# Patient Record
Sex: FEMALE | Race: BLACK OR AFRICAN AMERICAN | URBAN - METROPOLITAN AREA
[De-identification: names, ages, dates, MRNs, and addresses within clinical notes are randomized per-mention and may not be internally consistent; named-entity substitution may affect disease eponyms.]

---

## 2024-03-28 ENCOUNTER — LAB (OUTPATIENT)
Dept: URBAN - METROPOLITAN AREA CLINIC 74 | Facility: CLINIC | Age: 59
End: 2024-03-28

## 2024-03-28 ENCOUNTER — OV NP (OUTPATIENT)
Dept: URBAN - METROPOLITAN AREA CLINIC 74 | Facility: CLINIC | Age: 59
End: 2024-03-28
Payer: COMMERCIAL

## 2024-03-28 VITALS
WEIGHT: 204 LBS | BODY MASS INDEX: 33.99 KG/M2 | OXYGEN SATURATION: 97 % | SYSTOLIC BLOOD PRESSURE: 140 MMHG | HEIGHT: 65 IN | TEMPERATURE: 97.7 F | DIASTOLIC BLOOD PRESSURE: 90 MMHG | HEART RATE: 88 BPM

## 2024-03-28 DIAGNOSIS — G47.30 SLEEP APNEA IN ADULT: ICD-10-CM

## 2024-03-28 DIAGNOSIS — Z86.018 HISTORY OF UTERINE FIBROID: ICD-10-CM

## 2024-03-28 DIAGNOSIS — Z87.42 HISTORY OF MENORRHAGIA: ICD-10-CM

## 2024-03-28 DIAGNOSIS — E66.9 OBESITY (BMI 30-39.9): ICD-10-CM

## 2024-03-28 DIAGNOSIS — R10.819 LOWER ABDOMINAL TENDERNESS: ICD-10-CM

## 2024-03-28 DIAGNOSIS — K92.1 HEMATOCHEZIA: ICD-10-CM

## 2024-03-28 DIAGNOSIS — Z86.2 HISTORY OF ANEMIA: ICD-10-CM

## 2024-03-28 DIAGNOSIS — J45.901 ASTHMA EXACERBATION, MILD: ICD-10-CM

## 2024-03-28 PROBLEM — 275538002: Status: ACTIVE | Noted: 2024-03-28

## 2024-03-28 PROBLEM — 73430006: Status: ACTIVE | Noted: 2024-03-28

## 2024-03-28 PROBLEM — 161782001: Status: ACTIVE | Noted: 2024-03-28

## 2024-03-28 PROBLEM — 43478001: Status: ACTIVE | Noted: 2024-03-28

## 2024-03-28 PROBLEM — 449341000124102: Status: ACTIVE | Noted: 2024-03-28

## 2024-03-28 PROBLEM — 162864005: Status: ACTIVE | Noted: 2024-03-28

## 2024-03-28 PROBLEM — 271902005: Status: ACTIVE | Noted: 2024-03-28

## 2024-03-28 PROBLEM — 281239006: Status: ACTIVE | Noted: 2024-03-28

## 2024-03-28 PROCEDURE — 99204 OFFICE O/P NEW MOD 45 MIN: CPT | Performed by: INTERNAL MEDICINE

## 2024-03-28 RX ORDER — CYCLOBENZAPRINE HYDROCHLORIDE 10 MG/1
TABLET, FILM COATED ORAL
Qty: 21 TABLET | Status: ACTIVE | COMMUNITY

## 2024-03-28 RX ORDER — ERGOCALCIFEROL 1.25 MG/1
TAKE 1 CAPSULE BY MOUTH ONE TIME PER WEEK CAPSULE, LIQUID FILLED ORAL
Qty: 12 EACH | Refills: 0 | Status: ACTIVE | COMMUNITY

## 2024-03-28 RX ORDER — LOSARTAN POTASSIUM AND HYDROCHLOROTHIAZIDE 50; 12.5 MG/1; MG/1
TAKE 1 TABLET BY MOUTH EVERY DAY TABLET, FILM COATED ORAL
Qty: 90 EACH | Refills: 1 | Status: ACTIVE | COMMUNITY

## 2024-03-28 RX ORDER — BENZONATATE 200 MG/1
TAKE 1 CAPSULE (ORAL) 3 TIMES PER DAY (COUGH) FOR 7 DAYS CAPSULE ORAL
Qty: 21 EACH | Refills: 0 | Status: ACTIVE | COMMUNITY

## 2024-03-28 RX ORDER — FLUTICASONE FUROATE AND VILANTEROL TRIFENATATE 100; 25 UG/1; UG/1
POWDER RESPIRATORY (INHALATION)
Qty: 60 EACH | Status: ACTIVE | COMMUNITY

## 2024-03-28 RX ORDER — PREDNISONE 10 MG/1
TAKE 1 TABLET BY MOUTH TWICE A DAY FOR 5 DAYS TABLET ORAL
Qty: 10 EACH | Refills: 0 | Status: ACTIVE | COMMUNITY

## 2024-03-28 RX ORDER — SODIUM, POTASSIUM,MAG SULFATES 17.5-3.13G
AS DIRECTED SOLUTION, RECONSTITUTED, ORAL ORAL
OUTPATIENT
Start: 2024-03-28

## 2024-03-28 RX ORDER — METOPROLOL TARTRATE 50 MG/1
TAKE 1 TABLET BY MOUTH THE NIGHT BEFORE AND 1 TAB THE MORNING OF YOUR COROBARY CTA TABLET, FILM COATED ORAL
Qty: 2 EACH | Refills: 0 | Status: ACTIVE | COMMUNITY

## 2024-03-28 RX ORDER — DICLOFENAC SODIUM 50 MG/1
TABLET, DELAYED RELEASE ORAL
Qty: 14 TABLET | Status: ACTIVE | COMMUNITY

## 2024-03-28 RX ORDER — BACLOFEN 5 MG/1
TAKE 1 TABLET BY MOUTH EVERYDAY AT BEDTIME TABLET ORAL
Qty: 30 EACH | Refills: 0 | Status: ACTIVE | COMMUNITY

## 2024-03-28 RX ORDER — TRAMADOL HYDROCHLORIDE 50 MG/1
TAKE 1 TABLET (50 MG) BY MOUTH IN THE MORNING AND AT BEDTIME TABLET, FILM COATED ORAL
Qty: 30 EACH | Refills: 0 | Status: ACTIVE | COMMUNITY

## 2024-03-28 NOTE — PHYSICAL EXAM GASTROINTESTINAL
Abdomen, soft, tender lower abdomen, nondistended, no guarding or rigidity, no masses palpable, normal bowel sounds, Liver and Spleen,  no hepatosplenomegaly, liver nontender

## 2024-03-28 NOTE — HPI-TODAY'S VISIT:
The patient is a 58-year-old Afro-American female self-referred who presents to the office stating that for the past year she has been passing blood through the rectum.  Initially it was lower volumes, always bright red, always at the time of defecation, stools fluctuated in between type I and type IV on the [place] scale.  The patient has occasional anal discomfort at the time of defecation. The patient states that this week she had a larger amount of blood in the toilet bowl and the stool was darker.  That occurred 3 days ago, at the time stool was dark red and she noticed small clots on the toilet tissue. Since then stool became type IV on the [place] scale, light brown and with no blood. The patient has complaint of vague discomfort across the lower abdomen and has slight tenderness in the left lower quadrant and suprapubic areas. The patient also complaint of intermittent nausea with no vomiting, has early satiety, denies having any dysphagia, odynophagia, there has been no vomiting, no hematemesis or melena. The patient states that most of her adult life she has had anemia and in the past received intravenous iron infusions and currently takes oral supplements.  The patient was diagnosed with menorrhagia due to large fibroids, the patient had a uterine fibroid removed. The patient states that she had a colonoscopy maybe 7 years ago and no abnormalities were found. Currently she has a history of asthma and takes inhalers, might have sleep apnea, is in the process of being evaluated by her primary care physician. The patient has been recommended to get a CBC, iron studies and a ferritin, C-reactive protein, the patient is to get a CT of the abdomen and pelvis with contrast as long as the renal function allows it, the patient will be scheduled to get a colonoscopy, benefits potential complications and alternatives to colonoscopy were disclosed. The patient is to get clearance from PCP in view of the history of persistent asthma and possibly sleep apnea. The patient is obese.  The patient might have fatty liver.

## 2024-06-20 ENCOUNTER — TELEPHONE ENCOUNTER (OUTPATIENT)
Dept: URBAN - METROPOLITAN AREA CLINIC 74 | Facility: CLINIC | Age: 59
End: 2024-06-20

## 2024-07-16 ENCOUNTER — WEB ENCOUNTER (OUTPATIENT)
Dept: URBAN - METROPOLITAN AREA CLINIC 74 | Facility: CLINIC | Age: 59
End: 2024-07-16

## 2024-07-23 ENCOUNTER — OFFICE VISIT (OUTPATIENT)
Dept: URBAN - METROPOLITAN AREA SURGERY CENTER 30 | Facility: SURGERY CENTER | Age: 59
End: 2024-07-23

## 2024-08-06 ENCOUNTER — OFFICE VISIT (OUTPATIENT)
Dept: URBAN - METROPOLITAN AREA SURGERY CENTER 30 | Facility: SURGERY CENTER | Age: 59
End: 2024-08-06
Payer: COMMERCIAL

## 2024-08-06 DIAGNOSIS — D50.0 1. ANEMIA, IRON DEFICIENCY FROM CHRONIC BLOOD LOSS:: ICD-10-CM

## 2024-08-06 DIAGNOSIS — K62.5 ANAL BLEEDING: ICD-10-CM

## 2024-08-06 DIAGNOSIS — R10.31 ABDOMINAL CRAMPING IN RIGHT LOWER QUADRANT: ICD-10-CM

## 2024-08-06 DIAGNOSIS — K64.8 OTHER HEMORRHOIDS: ICD-10-CM

## 2024-08-06 DIAGNOSIS — R10.32 ABDOMINAL CRAMPING IN LEFT LOWER QUADRANT: ICD-10-CM

## 2024-08-06 DIAGNOSIS — K62.5 RECTAL BLEEDING: ICD-10-CM

## 2024-08-06 PROCEDURE — 00811 ANES LWR INTST NDSC NOS: CPT | Performed by: NURSE ANESTHETIST, CERTIFIED REGISTERED

## 2024-08-06 PROCEDURE — 45378 DIAGNOSTIC COLONOSCOPY: CPT | Performed by: INTERNAL MEDICINE

## 2024-08-06 RX ORDER — BACLOFEN 5 MG/1
TAKE 1 TABLET BY MOUTH EVERYDAY AT BEDTIME TABLET ORAL
Qty: 30 EACH | Refills: 0 | Status: ACTIVE | COMMUNITY

## 2024-08-06 RX ORDER — CYCLOBENZAPRINE HYDROCHLORIDE 10 MG/1
TABLET, FILM COATED ORAL
Qty: 21 TABLET | Status: ACTIVE | COMMUNITY

## 2024-08-06 RX ORDER — METOPROLOL TARTRATE 50 MG/1
TAKE 1 TABLET BY MOUTH THE NIGHT BEFORE AND 1 TAB THE MORNING OF YOUR COROBARY CTA TABLET, FILM COATED ORAL
Qty: 2 EACH | Refills: 0 | Status: ACTIVE | COMMUNITY

## 2024-08-06 RX ORDER — SODIUM, POTASSIUM,MAG SULFATES 17.5-3.13G
AS DIRECTED SOLUTION, RECONSTITUTED, ORAL ORAL
Status: ACTIVE | COMMUNITY
Start: 2024-03-28

## 2024-08-06 RX ORDER — FLUTICASONE FUROATE AND VILANTEROL TRIFENATATE 100; 25 UG/1; UG/1
POWDER RESPIRATORY (INHALATION)
Qty: 60 EACH | Status: ACTIVE | COMMUNITY

## 2024-08-06 RX ORDER — BENZONATATE 200 MG/1
TAKE 1 CAPSULE (ORAL) 3 TIMES PER DAY (COUGH) FOR 7 DAYS CAPSULE ORAL
Qty: 21 EACH | Refills: 0 | Status: ACTIVE | COMMUNITY

## 2024-08-06 RX ORDER — TRAMADOL HYDROCHLORIDE 50 MG/1
TAKE 1 TABLET (50 MG) BY MOUTH IN THE MORNING AND AT BEDTIME TABLET, FILM COATED ORAL
Qty: 30 EACH | Refills: 0 | Status: ACTIVE | COMMUNITY

## 2024-08-06 RX ORDER — DICLOFENAC SODIUM 50 MG/1
TABLET, DELAYED RELEASE ORAL
Qty: 14 TABLET | Status: ACTIVE | COMMUNITY

## 2024-08-06 RX ORDER — ERGOCALCIFEROL 1.25 MG/1
TAKE 1 CAPSULE BY MOUTH ONE TIME PER WEEK CAPSULE, LIQUID FILLED ORAL
Qty: 12 EACH | Refills: 0 | Status: ACTIVE | COMMUNITY

## 2024-08-06 RX ORDER — PREDNISONE 10 MG/1
TAKE 1 TABLET BY MOUTH TWICE A DAY FOR 5 DAYS TABLET ORAL
Qty: 10 EACH | Refills: 0 | Status: ACTIVE | COMMUNITY

## 2024-08-06 RX ORDER — LOSARTAN POTASSIUM AND HYDROCHLOROTHIAZIDE 50; 12.5 MG/1; MG/1
TAKE 1 TABLET BY MOUTH EVERY DAY TABLET, FILM COATED ORAL
Qty: 90 EACH | Refills: 1 | Status: ACTIVE | COMMUNITY

## 2024-09-10 ENCOUNTER — OFFICE VISIT (OUTPATIENT)
Dept: URBAN - METROPOLITAN AREA CLINIC 74 | Facility: CLINIC | Age: 59
End: 2024-09-10
Payer: COMMERCIAL

## 2024-09-10 ENCOUNTER — DASHBOARD ENCOUNTERS (OUTPATIENT)
Age: 59
End: 2024-09-10

## 2024-09-10 VITALS
TEMPERATURE: 98 F | BODY MASS INDEX: 33.39 KG/M2 | SYSTOLIC BLOOD PRESSURE: 142 MMHG | OXYGEN SATURATION: 98 % | HEART RATE: 71 BPM | WEIGHT: 200.4 LBS | HEIGHT: 65 IN | DIASTOLIC BLOOD PRESSURE: 84 MMHG

## 2024-09-10 DIAGNOSIS — K92.1 HEMATOCHEZIA: ICD-10-CM

## 2024-09-10 DIAGNOSIS — K64.8 INTERNAL HEMORRHOIDS: ICD-10-CM

## 2024-09-10 PROCEDURE — 99213 OFFICE O/P EST LOW 20 MIN: CPT

## 2024-09-10 RX ORDER — CYCLOBENZAPRINE HYDROCHLORIDE 10 MG/1
TABLET, FILM COATED ORAL
Qty: 21 TABLET | Status: DISCONTINUED | COMMUNITY

## 2024-09-10 RX ORDER — BENZONATATE 200 MG/1
TAKE 1 CAPSULE (ORAL) 3 TIMES PER DAY (COUGH) FOR 7 DAYS CAPSULE ORAL
Qty: 21 EACH | Refills: 0 | Status: DISCONTINUED | COMMUNITY

## 2024-09-10 RX ORDER — METOPROLOL TARTRATE 50 MG/1
TAKE 1 TABLET BY MOUTH THE NIGHT BEFORE AND 1 TAB THE MORNING OF YOUR COROBARY CTA TABLET, FILM COATED ORAL
Qty: 2 EACH | Refills: 0 | Status: DISCONTINUED | COMMUNITY

## 2024-09-10 RX ORDER — TRAMADOL HYDROCHLORIDE 50 MG/1
TAKE 1 TABLET (50 MG) BY MOUTH IN THE MORNING AND AT BEDTIME TABLET, FILM COATED ORAL
Qty: 30 EACH | Refills: 0 | Status: ACTIVE | COMMUNITY

## 2024-09-10 RX ORDER — LOSARTAN POTASSIUM AND HYDROCHLOROTHIAZIDE 50; 12.5 MG/1; MG/1
TAKE 1 TABLET BY MOUTH EVERY DAY TABLET, FILM COATED ORAL
Qty: 90 EACH | Refills: 1 | Status: ACTIVE | COMMUNITY

## 2024-09-10 RX ORDER — FLUTICASONE FUROATE AND VILANTEROL TRIFENATATE 100; 25 UG/1; UG/1
POWDER RESPIRATORY (INHALATION)
Qty: 60 EACH | Status: ACTIVE | COMMUNITY

## 2024-09-10 RX ORDER — DICLOFENAC SODIUM 50 MG/1
TABLET, DELAYED RELEASE ORAL
Qty: 14 TABLET | Status: ACTIVE | COMMUNITY

## 2024-09-10 RX ORDER — FLUTICASONE PROPIONATE 50 UG/1
1 SPRAY IN EACH NOSTRIL SPRAY, METERED NASAL ONCE A DAY
Status: ACTIVE | COMMUNITY

## 2024-09-10 RX ORDER — SODIUM, POTASSIUM,MAG SULFATES 17.5-3.13G
AS DIRECTED SOLUTION, RECONSTITUTED, ORAL ORAL
Status: DISCONTINUED | COMMUNITY
Start: 2024-03-28

## 2024-09-10 RX ORDER — BACLOFEN 5 MG/1
TAKE 1 TABLET BY MOUTH EVERYDAY AT BEDTIME TABLET ORAL
Qty: 30 EACH | Refills: 0 | Status: ACTIVE | COMMUNITY

## 2024-09-10 RX ORDER — ERGOCALCIFEROL 1.25 MG/1
TAKE 1 CAPSULE BY MOUTH ONE TIME PER WEEK CAPSULE, LIQUID FILLED ORAL
Qty: 12 EACH | Refills: 0 | Status: ACTIVE | COMMUNITY

## 2024-09-10 RX ORDER — PREDNISONE 10 MG/1
TAKE 1 TABLET BY MOUTH TWICE A DAY FOR 5 DAYS TABLET ORAL
Qty: 10 EACH | Refills: 0 | Status: ACTIVE | COMMUNITY

## 2024-09-10 NOTE — HPI-TODAY'S VISIT:
The patient is a 58-year-old Afro-American female self-referred who presents to the office stating that for the past year she has been passing blood through the rectum.  Initially it was lower volumes, always bright red, always at the time of defecation, stools fluctuated in between type I and type IV on the [place] scale.  The patient has occasional anal discomfort at the time of defecation. The patient states that this week she had a larger amount of blood in the toilet bowl and the stool was darker.  That occurred 3 days ago, at the time stool was dark redand she noticed small clots on the toilet tissue. Since then stool became type IV on the [place] scale, light brown and with no blood. The patient has complaint of vague discomfort across the lower abdomen and has slight tenderness in the left lower quadrant and suprapubic areas. The patient also complaint of intermittent nausea with no vomiting, has early satiety, denies having any dysphagia, odynophagia, there has been no vomiting, no hematemesis or melena. The patient states that most of her adult life she has had anemia and in the past received intravenous iron infusions and currently takes oral supplements.  The patient was diagnosed with menorrhagia due to large fibroids, the patient had a uterine fibroid removed. The patient states that she had a colonoscopy maybe 7 years ago and no abnormalities were found. Currently she has a history of asthma and takes inhalers, might have sleep apnea, is in the process of being evaluated by her primary care physician. The patient has been recommended to get a CBC, iron studies and a ferritin, C-reactive protein, the patient is to get a CT of the abdomen and pelvis with contrast as long as the renal function allows it, the patient will be scheduled to get a colonoscopy, benefits potential complications and alternatives to colonoscopy were disclosed. The patient is to get clearance from PCP in view of the history of persistent asthma and possibly sleep apnea. The patient is obese.  The patient might have fatty liver.  Today, 9/10/24, patient presents for follow up from colonoscopy. Has had one instance since colonoscopy of bright red blood on toilet paper. She still notices some discomfort from hemorrhoids. Preperation H has not helped much.  Colonoscopy 8/6/24 Dr. Whelan: entire examined colon normal, normal examined portion of ileum, non-bleeding non-thrombosed internal hemorrhoids.  CT abdomen/pelvis w/o contrast 05/2024: suspected mild hepatic steatosis, Subtle subcentimeter hypodensity along right lobe which is too small to characterize although statistically represents a tiny cyst, gallbladder with subtle increased attenuation may reflect sludge or gallstones with no inflammatory changes, leiomyomatous uterus. Labs: normal iron studies, normal CMP, CBC with elevated eosinophils, CRP elevated at 24.7.

## 2025-03-17 ENCOUNTER — TELEPHONE ENCOUNTER (OUTPATIENT)
Dept: URBAN - METROPOLITAN AREA CLINIC 40 | Facility: CLINIC | Age: 60
End: 2025-03-17